# Patient Record
Sex: FEMALE | Race: WHITE | NOT HISPANIC OR LATINO | Employment: OTHER | URBAN - METROPOLITAN AREA
[De-identification: names, ages, dates, MRNs, and addresses within clinical notes are randomized per-mention and may not be internally consistent; named-entity substitution may affect disease eponyms.]

---

## 2022-04-10 ENCOUNTER — HISTORICAL (OUTPATIENT)
Dept: ADMINISTRATIVE | Facility: HOSPITAL | Age: 62
End: 2022-04-10

## 2022-04-27 VITALS
BODY MASS INDEX: 27.77 KG/M2 | WEIGHT: 166.69 LBS | HEIGHT: 65 IN | SYSTOLIC BLOOD PRESSURE: 108 MMHG | DIASTOLIC BLOOD PRESSURE: 70 MMHG

## 2023-04-28 DIAGNOSIS — G25.0 BENIGN ESSENTIAL TREMOR: Primary | ICD-10-CM

## 2023-05-01 RX ORDER — PROPRANOLOL HYDROCHLORIDE 40 MG/1
TABLET ORAL
Qty: 60 TABLET | Refills: 1 | Status: SHIPPED | OUTPATIENT
Start: 2023-05-01 | End: 2023-06-22 | Stop reason: SDUPTHER

## 2023-06-22 ENCOUNTER — OFFICE VISIT (OUTPATIENT)
Dept: NEUROLOGY | Facility: CLINIC | Age: 63
End: 2023-06-22
Payer: MEDICARE

## 2023-06-22 VITALS
WEIGHT: 156 LBS | HEIGHT: 64 IN | BODY MASS INDEX: 26.63 KG/M2 | DIASTOLIC BLOOD PRESSURE: 89 MMHG | HEART RATE: 53 BPM | SYSTOLIC BLOOD PRESSURE: 150 MMHG

## 2023-06-22 DIAGNOSIS — G24.9 DYSKINESIA: ICD-10-CM

## 2023-06-22 DIAGNOSIS — G25.0 BENIGN ESSENTIAL TREMOR: Primary | ICD-10-CM

## 2023-06-22 PROCEDURE — 3008F PR BODY MASS INDEX (BMI) DOCUMENTED: ICD-10-PCS | Mod: CPTII,S$GLB,, | Performed by: NURSE PRACTITIONER

## 2023-06-22 PROCEDURE — 3079F DIAST BP 80-89 MM HG: CPT | Mod: CPTII,S$GLB,, | Performed by: NURSE PRACTITIONER

## 2023-06-22 PROCEDURE — 1159F PR MEDICATION LIST DOCUMENTED IN MEDICAL RECORD: ICD-10-PCS | Mod: CPTII,S$GLB,, | Performed by: NURSE PRACTITIONER

## 2023-06-22 PROCEDURE — 99214 OFFICE O/P EST MOD 30 MIN: CPT | Mod: S$GLB,,, | Performed by: NURSE PRACTITIONER

## 2023-06-22 PROCEDURE — 3077F SYST BP >= 140 MM HG: CPT | Mod: CPTII,S$GLB,, | Performed by: NURSE PRACTITIONER

## 2023-06-22 PROCEDURE — 3077F PR MOST RECENT SYSTOLIC BLOOD PRESSURE >= 140 MM HG: ICD-10-PCS | Mod: CPTII,S$GLB,, | Performed by: NURSE PRACTITIONER

## 2023-06-22 PROCEDURE — 99999 PR PBB SHADOW E&M-EST. PATIENT-LVL III: ICD-10-PCS | Mod: PBBFAC,,, | Performed by: NURSE PRACTITIONER

## 2023-06-22 PROCEDURE — 3079F PR MOST RECENT DIASTOLIC BLOOD PRESSURE 80-89 MM HG: ICD-10-PCS | Mod: CPTII,S$GLB,, | Performed by: NURSE PRACTITIONER

## 2023-06-22 PROCEDURE — 99214 PR OFFICE/OUTPT VISIT, EST, LEVL IV, 30-39 MIN: ICD-10-PCS | Mod: S$GLB,,, | Performed by: NURSE PRACTITIONER

## 2023-06-22 PROCEDURE — 3008F BODY MASS INDEX DOCD: CPT | Mod: CPTII,S$GLB,, | Performed by: NURSE PRACTITIONER

## 2023-06-22 PROCEDURE — 99999 PR PBB SHADOW E&M-EST. PATIENT-LVL III: CPT | Mod: PBBFAC,,, | Performed by: NURSE PRACTITIONER

## 2023-06-22 PROCEDURE — 1159F MED LIST DOCD IN RCRD: CPT | Mod: CPTII,S$GLB,, | Performed by: NURSE PRACTITIONER

## 2023-06-22 RX ORDER — ALBUTEROL SULFATE 90 UG/1
AEROSOL, METERED RESPIRATORY (INHALATION)
COMMUNITY
Start: 2023-05-30

## 2023-06-22 RX ORDER — MELOXICAM 15 MG/1
15 TABLET ORAL DAILY
COMMUNITY
Start: 2023-05-01

## 2023-06-22 RX ORDER — OMEPRAZOLE 20 MG/1
20 CAPSULE, DELAYED RELEASE ORAL DAILY
COMMUNITY
Start: 2023-05-30

## 2023-06-22 RX ORDER — VENLAFAXINE HYDROCHLORIDE 150 MG/1
150 CAPSULE, EXTENDED RELEASE ORAL DAILY
COMMUNITY
Start: 2023-05-30

## 2023-06-22 RX ORDER — HYDROCODONE BITARTRATE AND ACETAMINOPHEN 10; 325 MG/1; MG/1
1 TABLET ORAL 3 TIMES DAILY PRN
COMMUNITY
Start: 2023-06-06

## 2023-06-22 RX ORDER — VENLAFAXINE HYDROCHLORIDE 75 MG/1
75 CAPSULE, EXTENDED RELEASE ORAL DAILY
COMMUNITY
Start: 2023-05-30

## 2023-06-22 RX ORDER — TRAZODONE HYDROCHLORIDE 100 MG/1
100 TABLET ORAL NIGHTLY
COMMUNITY
Start: 2023-05-30

## 2023-06-22 RX ORDER — ROPINIROLE 2 MG/1
2 TABLET, FILM COATED ORAL 2 TIMES DAILY
COMMUNITY
Start: 2023-05-30

## 2023-06-22 RX ORDER — SIMVASTATIN 20 MG/1
20 TABLET, FILM COATED ORAL NIGHTLY
COMMUNITY
Start: 2023-05-30

## 2023-06-22 RX ORDER — TRAMADOL HYDROCHLORIDE 50 MG/1
50 TABLET ORAL 3 TIMES DAILY PRN
COMMUNITY
Start: 2023-05-30

## 2023-06-22 RX ORDER — ESZOPICLONE 2 MG/1
2 TABLET, FILM COATED ORAL NIGHTLY
COMMUNITY

## 2023-06-22 RX ORDER — HYDROCHLOROTHIAZIDE 12.5 MG/1
12.5 TABLET ORAL DAILY
COMMUNITY
Start: 2023-05-30

## 2023-06-22 RX ORDER — PROPRANOLOL HYDROCHLORIDE 40 MG/1
40 TABLET ORAL 2 TIMES DAILY
Qty: 60 TABLET | Refills: 11 | Status: SHIPPED | OUTPATIENT
Start: 2023-06-22

## 2023-06-22 RX ORDER — FLUTICASONE PROPIONATE 50 MCG
SPRAY, SUSPENSION (ML) NASAL
COMMUNITY
Start: 2023-06-01

## 2023-06-22 RX ORDER — MONTELUKAST SODIUM 10 MG/1
10 TABLET ORAL
COMMUNITY
Start: 2023-05-30

## 2023-06-22 NOTE — ASSESSMENT & PLAN NOTE
-likely a result of long-term Requip use which we discussed.  She has tried to wean off Requip in the past and has been unsuccessful.  Overall, the movements are not overly bothersome to her

## 2023-06-22 NOTE — PROGRESS NOTES
Subjective:       Patient ID: Jazmín Briones is a 62 y.o. female.    Chief Complaint: Tremors (Currently propranolol 40 mg BID denies increase of severity )      History of Present Illness:  Follow-up visit for by 9 essential tremor.  She feels her tremors are overall the same.  She did increase propranolol to 40 mg b.i.d. after last visit.  She does find that it helps to control her hand and head tremor a little bit better, but she does feel like there is still room for improvement in regards to symptom control.  Overall, though, she feels tremor control is very well managed and the tremor is not inhibitory in any way for her.  Heart rate today was 53.  Since last visit, she had a right knee replacement, right total shoulder replacement, and multilevel neck surgery.  She has noticed an involuntary movement to her right shoulder that has been present for several months.  She says it just feels like her shoulder is being pulled forward and then goes back.      Review of Systems  I have reviewed a 12 point review of systems which is negative unless otherwise stated in HPI        Past Medical History:   Diagnosis Date    Hyperlipidemia     Hypertension     Osteoarthritis     Tremor        Past Surgical History:   Procedure Laterality Date    CARPAL TUNNEL RELEASE      CERVICAL DISC SURGERY      GASTRIC BYPASS      KNEE SURGERY Bilateral     LUMPECTOMY, BREAST Bilateral     SHOULDER SURGERY Bilateral     SINUS SURGERY      TONSILLECTOMY      TUBAL LIGATION          Family History   Problem Relation Age of Onset    Sada-Danlos syndrome Mother     Sada-Danlos syndrome Sister         Social History     Socioeconomic History    Marital status:    Tobacco Use    Smoking status: Never    Smokeless tobacco: Never   Substance and Sexual Activity    Alcohol use: Not Currently        Outpatient Encounter Medications as of 6/22/2023   Medication Sig Dispense Refill    albuterol (PROVENTIL/VENTOLIN HFA) 90 mcg/actuation  "inhaler Inhale into the lungs.      eszopiclone (LUNESTA) 2 MG Tab Take 2 mg by mouth every evening.      fluticasone propionate (FLONASE) 50 mcg/actuation nasal spray by Each Nostril route.      hydroCHLOROthiazide (HYDRODIURIL) 12.5 MG Tab Take 12.5 mg by mouth once daily.      HYDROcodone-acetaminophen (NORCO)  mg per tablet Take 1 tablet by mouth 3 (three) times daily as needed.      meloxicam (MOBIC) 15 MG tablet Take 15 mg by mouth once daily.      montelukast (SINGULAIR) 10 mg tablet Take 10 mg by mouth.      omeprazole (PRILOSEC) 20 MG capsule Take 20 mg by mouth once daily.      rOPINIRole (REQUIP) 2 MG tablet Take 2 mg by mouth 2 (two) times a day. 1 tablet qAM and 2 tablet qPM      simvastatin (ZOCOR) 20 MG tablet Take 20 mg by mouth every evening.      traMADoL (ULTRAM) 50 mg tablet Take 50 mg by mouth 3 (three) times daily as needed.      traZODone (DESYREL) 100 MG tablet Take 100 mg by mouth every evening.      venlafaxine (EFFEXOR-XR) 150 MG Cp24 Take 150 mg by mouth once daily. To be taken with 50 mg to equal 225 mg      venlafaxine (EFFEXOR-XR) 75 MG 24 hr capsule Take 75 mg by mouth once daily. To be taken with 150 mg to equal 225 mg      [DISCONTINUED] propranoloL (INDERAL) 40 MG tablet TAKE ONE TABLET BY MOUTH TWICE DAILY 60 tablet 1    propranoloL (INDERAL) 40 MG tablet Take 1 tablet (40 mg total) by mouth 2 (two) times daily. 60 tablet 11     No facility-administered encounter medications on file as of 6/22/2023.      Objective:   BP (!) 150/89   Pulse (!) 53   Ht 5' 4" (1.626 m)   Wt 70.8 kg (156 lb)   BMI 26.78 kg/m²        Physical Exam  General:  Alert and oriented  NAD  No overt edema    Cognition and Comprehension:  Speech and language intact  Follows commands    Cranial nerves:   CN 2_ Visual fields (full to confrontation both eyes)  CN 3, 4, 6_ Intact, HARRISON, no nystagmus  CN 5_facial tactile sensation intact  CN 7_no face asymmetry; normal eye closure and smile  CN 8_hearing " intact to spoken voice  CN 9, 10, 11_voice normal, shoulder shrug ok; deltoids not fatigable   CN 12 tongue_protrudes mid line    Muscle Strength and Tone:  Normal upper extremity tone  Normal lower extremity tone  Normal upper extremity strength  Normal lower extremity strength    very mild BUE intention tremor high frequency L>R  Fidgetiness  Very mild no-no head tremor    No bradykinesia    Coordination and Gait:  Antalgic; Varus deformity with limp      Assessment & Plan:      1. Benign essential tremor  Overview:  Initially evaluated August 2024 bilateral hand tremor that started about 15 years prior to that.  MRI brain around that time was reportedly normal.  She had been tried on carbidopa levodopa prior to establishing with us and it did not help her tremor.  Of note, she has been on Requip for over 10 years.  She is also on chronic pain medication.  She is on propranolol 40 mg BID for tremor and doing well.    Assessment & Plan:  -overall stable.  She does feel like the medication could be a little bit more effective, but discussed with her the risk for reducing baseline low heart rate even further.  She is okay with continuing 40 mg b.i.d. since her symptoms are still very much manageable    Orders:  -     propranoloL (INDERAL) 40 MG tablet; Take 1 tablet (40 mg total) by mouth 2 (two) times daily.  Dispense: 60 tablet; Refill: 11    2. Dyskinesia  Assessment & Plan:  -likely a result of long-term Requip use which we discussed.  She has tried to wean off Requip in the past and has been unsuccessful.  Overall, the movements are not overly bothersome to her            This note was created with the assistance of voice recognition software. There may be transcription errors as a result of using this technology however minimal. Effort has been made to assure accuracy of transcription but any obvious errors or omissions should be clarified with the author of the document.

## 2023-06-22 NOTE — ASSESSMENT & PLAN NOTE
-overall stable.  She does feel like the medication could be a little bit more effective, but discussed with her the risk for reducing baseline low heart rate even further.  She is okay with continuing 40 mg b.i.d. since her symptoms are still very much manageable

## 2024-07-05 DIAGNOSIS — G25.0 BENIGN ESSENTIAL TREMOR: ICD-10-CM

## 2024-07-09 RX ORDER — PROPRANOLOL HYDROCHLORIDE 40 MG/1
40 TABLET ORAL 2 TIMES DAILY
Qty: 60 TABLET | Refills: 11 | Status: SHIPPED | OUTPATIENT
Start: 2024-07-09

## 2024-08-01 ENCOUNTER — OFFICE VISIT (OUTPATIENT)
Dept: NEUROLOGY | Facility: CLINIC | Age: 64
End: 2024-08-01
Payer: MEDICARE

## 2024-08-01 VITALS
BODY MASS INDEX: 31.69 KG/M2 | DIASTOLIC BLOOD PRESSURE: 75 MMHG | HEIGHT: 64 IN | WEIGHT: 185.63 LBS | HEART RATE: 57 BPM | SYSTOLIC BLOOD PRESSURE: 123 MMHG

## 2024-08-01 DIAGNOSIS — G25.0 BENIGN ESSENTIAL TREMOR: Primary | ICD-10-CM

## 2024-08-01 PROCEDURE — 1160F RVW MEDS BY RX/DR IN RCRD: CPT | Mod: CPTII,S$GLB,, | Performed by: NURSE PRACTITIONER

## 2024-08-01 PROCEDURE — 99999 PR PBB SHADOW E&M-EST. PATIENT-LVL III: CPT | Mod: PBBFAC,,, | Performed by: NURSE PRACTITIONER

## 2024-08-01 PROCEDURE — 1159F MED LIST DOCD IN RCRD: CPT | Mod: CPTII,S$GLB,, | Performed by: NURSE PRACTITIONER

## 2024-08-01 PROCEDURE — 3074F SYST BP LT 130 MM HG: CPT | Mod: CPTII,S$GLB,, | Performed by: NURSE PRACTITIONER

## 2024-08-01 PROCEDURE — 3008F BODY MASS INDEX DOCD: CPT | Mod: CPTII,S$GLB,, | Performed by: NURSE PRACTITIONER

## 2024-08-01 PROCEDURE — 3078F DIAST BP <80 MM HG: CPT | Mod: CPTII,S$GLB,, | Performed by: NURSE PRACTITIONER

## 2024-08-01 PROCEDURE — 99214 OFFICE O/P EST MOD 30 MIN: CPT | Mod: S$GLB,,, | Performed by: NURSE PRACTITIONER

## 2024-08-01 NOTE — ASSESSMENT & PLAN NOTE
-HR on lower end today so do not want to increase propranolol dose.  Try splitting nighttime dose to 20 mg around 2-3 pm and 20 mg in PM.  Continue 40 mg in AM

## 2024-08-01 NOTE — PROGRESS NOTES
Subjective:       Patient ID: Jazmín Briones is a 63 y.o. female.    Chief Complaint: Tremors (Pt states tremors return in the afternoon propranolol has been helpful but seems to wear off at the end of the day )      History of Present Illness:  Follow-up visit for benign essential tremor.  Tremor is overall the same apart from wear off of medication in the afternoons.  She takes propranolol 40 mg b.i.d..  That helps to control her tremor tremendously in the morning times, but around 2-3 p.m., the dose starts to wear off.  She does not take her next dose until 10:00 p.m..  Heart rate today is 57.  She historically runs in the 50s.            Past Medical History:   Diagnosis Date    Hyperlipidemia     Hypertension     Osteoarthritis     Tremor        Past Surgical History:   Procedure Laterality Date    CARPAL TUNNEL RELEASE      CERVICAL DISC SURGERY      GASTRIC BYPASS      KNEE SURGERY Bilateral     LUMPECTOMY, BREAST Bilateral     SHOULDER SURGERY Bilateral     SINUS SURGERY      TONSILLECTOMY      TUBAL LIGATION          Family History   Problem Relation Name Age of Onset    Sada-Danlos syndrome Mother      Sada-Danlos syndrome Sister          Social History     Socioeconomic History    Marital status:    Tobacco Use    Smoking status: Never    Smokeless tobacco: Never   Substance and Sexual Activity    Alcohol use: Not Currently        Outpatient Encounter Medications as of 8/1/2024   Medication Sig Dispense Refill    albuterol (PROVENTIL/VENTOLIN HFA) 90 mcg/actuation inhaler Inhale into the lungs.      eszopiclone (LUNESTA) 2 MG Tab Take 2 mg by mouth every evening.      fluticasone propionate (FLONASE) 50 mcg/actuation nasal spray by Each Nostril route.      hydroCHLOROthiazide (HYDRODIURIL) 12.5 MG Tab Take 12.5 mg by mouth once daily.      HYDROcodone-acetaminophen (NORCO)  mg per tablet Take 1 tablet by mouth 3 (three) times daily as needed.      meloxicam (MOBIC) 15 MG tablet Take 15 mg by  "mouth once daily.      montelukast (SINGULAIR) 10 mg tablet Take 10 mg by mouth.      omeprazole (PRILOSEC) 20 MG capsule Take 20 mg by mouth once daily.      propranoloL (INDERAL) 40 MG tablet TAKE ONE TABLET BY MOUTH TWICE DAILY 60 tablet 11    rOPINIRole (REQUIP) 2 MG tablet Take 2 mg by mouth 2 (two) times a day. 1 tablet qAM and 2 tablet qPM      simvastatin (ZOCOR) 20 MG tablet Take 20 mg by mouth every evening.      traMADoL (ULTRAM) 50 mg tablet Take 50 mg by mouth 3 (three) times daily as needed.      traZODone (DESYREL) 100 MG tablet Take 100 mg by mouth every evening.      venlafaxine (EFFEXOR-XR) 150 MG Cp24 Take 150 mg by mouth once daily. To be taken with 75 mg to equal 225 mg      venlafaxine (EFFEXOR-XR) 75 MG 24 hr capsule Take 75 mg by mouth once daily. To be taken with 150 mg to equal 225 mg      [DISCONTINUED] propranoloL (INDERAL) 40 MG tablet Take 1 tablet (40 mg total) by mouth 2 (two) times daily. 60 tablet 11     No facility-administered encounter medications on file as of 8/1/2024.      Objective:   /75   Pulse (!) 57   Ht 5' 4" (1.626 m)   Wt 84.2 kg (185 lb 9.6 oz)   BMI 31.86 kg/m²        Physical Exam  General:  Alert and oriented  NAD  No overt edema    Cognition and Comprehension:  Speech and language intact  Follows commands    Cranial nerves:   CN 2_ Visual fields (full to confrontation both eyes)  CN 3, 4, 6_ Intact, HARRISON, no nystagmus  CN 5_facial tactile sensation intact  CN 7_no face asymmetry; normal eye closure and smile  CN 8_hearing intact to spoken voice  CN 9, 10, 11_voice normal, shoulder shrug ok; deltoids not fatigable   CN 12 tongue_protrudes mid line    Muscle Strength and Tone:  Normal upper extremity tone  Normal lower extremity tone  Normal upper extremity strength, restricted ROM with right shoulder from injury  Normal lower extremity strength  Mild BUE intention tremor high freq L>R    Coordination and Gait:  Coordination and gait are normal     "   Assessment & Plan:      1. Benign essential tremor  Overview:  Initially evaluated August 2024 bilateral hand tremor that started about 15 years prior to that.  MRI brain around that time was reportedly normal.  She had been tried on carbidopa levodopa prior to establishing with us and it did not help her tremor.  Of note, she has been on Requip for over 10 years.  She is also on chronic pain medication.  She is on propranolol 40 mg BID for tremor and doing well.    Assessment & Plan:  -HR on lower end today so do not want to increase propranolol dose.  Try splitting nighttime dose to 20 mg around 2-3 pm and 20 mg in PM.  Continue 40 mg in AM            This note was created with the assistance of voice recognition software. There may be transcription errors as a result of using this technology however minimal. Effort has been made to assure accuracy of transcription but any obvious errors or omissions should be clarified with the author of the document.

## 2025-06-27 DIAGNOSIS — G25.0 BENIGN ESSENTIAL TREMOR: ICD-10-CM

## 2025-06-30 RX ORDER — PROPRANOLOL HYDROCHLORIDE 40 MG/1
40 TABLET ORAL 2 TIMES DAILY
Qty: 60 TABLET | Refills: 11 | Status: SHIPPED | OUTPATIENT
Start: 2025-06-30